# Patient Record
Sex: FEMALE | Race: WHITE | Employment: STUDENT | URBAN - METROPOLITAN AREA
[De-identification: names, ages, dates, MRNs, and addresses within clinical notes are randomized per-mention and may not be internally consistent; named-entity substitution may affect disease eponyms.]

---

## 2018-12-28 ENCOUNTER — OFFICE VISIT (OUTPATIENT)
Dept: OBGYN CLINIC | Age: 23
End: 2018-12-28

## 2018-12-28 VITALS — HEIGHT: 71 IN | BODY MASS INDEX: 21.84 KG/M2 | WEIGHT: 156 LBS

## 2018-12-28 DIAGNOSIS — Z01.419 WELL WOMAN EXAM WITH ROUTINE GYNECOLOGICAL EXAM: Primary | ICD-10-CM

## 2018-12-28 RX ORDER — DESOGESTREL AND ETHINYL ESTRADIOL 0.15-0.03
1 KIT ORAL DAILY
Qty: 3 PACKAGE | Refills: 4 | Status: SHIPPED | OUTPATIENT
Start: 2018-12-28

## 2018-12-28 RX ORDER — LEVOCETIRIZINE DIHYDROCHLORIDE 5 MG/1
TABLET, FILM COATED ORAL
COMMUNITY

## 2018-12-28 NOTE — PROGRESS NOTES
Annual exam ages 65-17    Balwinderbarry Fulton Carlos Fonseca is a No obstetric history on file. ,  26 y.o. female [facility] Patient's last menstrual period was 12/28/2018 (exact date). .    She presents for her annual checkup. She is having significant irregular periods, varying in length from 24-30 days. She desires to go on birth control to help regulate menses. Third year at [employer]; considering [employer] or global development. Hasn't been sexually active. Interested in improving menses  With regard to the Gardasil vaccine, she has received all 3 injections. Menstrual status:    Her periods are variant in flow. She is using three to five pads or tampons per day, lasting 24-30 days. She denies dysmenorrhea. She reports no premenstrual symptoms. Contraception:    The current method of family planning is none. Sexual history:     She  reports that she does not engage in sexual activity. .    Medical conditions:    She has never had an annual exam.    Pap and Mammogram History:    She has never had a pap smear. Breast Cancer History/Substance Abuse: negative    Past Medical History:   Diagnosis Date    Asthma      Past Surgical History:   Procedure Laterality Date    HX HEENT      Dental    HX WISDOM TEETH EXTRACTION         Current Outpatient Medications   Medication Sig Dispense Refill    levocetirizine (XYZAL) 5 mg tablet Take  by mouth. Allergies: Codeine     Tobacco History:  reports that  has never smoked. she has never used smokeless tobacco.  Alcohol Abuse:  reports that she drinks alcohol. Drug Abuse:  reports that she does not use drugs.     Family Medical/Cancer History:   Family History   Problem Relation Age of Onset    Breast Cancer Maternal Grandmother     Heart Disease Maternal Grandmother     Heart Disease Maternal Grandfather     Ovarian Cancer Paternal Grandfather         Review of Systems - History obtained from the patient  Constitutional: negative for weight loss, fever, night sweats  HEENT: negative for hearing loss, earache, congestion, snoring, sorethroat  CV: negative for chest pain, palpitations, edema  Resp: negative for cough, shortness of breath, wheezing  GI: negative for change in bowel habits, abdominal pain, black or bloody stools  : negative for frequency, dysuria, hematuria, vaginal discharge  MSK: negative for back pain, joint pain, muscle pain  Breast: negative for breast lumps, nipple discharge, galactorrhea  Skin :negative for itching, rash, hives  Neuro: negative for dizziness, headache, confusion, weakness  Psych: negative for anxiety, depression, change in mood  Heme/lymph: negative for bleeding, bruising, pallor    Physical Exam    Visit Vitals  Ht 5' 11\" (1.803 m)   Wt 156 lb (70.8 kg)   LMP 12/28/2018 (Exact Date)   BMI 21.76 kg/m²       Constitutional  · Appearance: well-nourished, well developed, alert, in no acute distress    HENT  · Head and Face: appears normal    Chest  · Respiratory Effort: breathing unlabored    Breasts  · Inspection of Breasts: breasts symmetrical, no skin changes, no discharge present, nipple appearance normal, no skin retraction present  · Palpation of Breasts and Axillae: no masses present on palpation, no breast tenderness  · Axillary Lymph Nodes: no lymphadenopathy present    Gastrointestinal  · Abdominal Examination: abdomen non-tender to palpation, normal bowel sounds, no masses present  · Liver and spleen: no hepatomegaly present, spleen not palpable  · Hernias: no hernias identified    Genitourinary  · External Genitalia: normal appearance for age, no discharge present, no tenderness present, no inflammatory lesions present, no masses present, no atrophy present  · Vagina: normal vaginal vault without central or paravaginal defects, no discharge present, no inflammatory lesions present, no masses present  · Bladder: non-tender to palpation  · Urethra: appears normal  · Cervix: normal   · Uterus: normal size, shape and consistency  · Adnexa: no adnexal tenderness present, no adnexal masses present  · Perineum: perineum within normal limits, no evidence of trauma, no rashes or skin lesions present  · Anus: anus within normal limits, no hemorrhoids present  · Inguinal Lymph Nodes: no lymphadenopathy present    Skin  · General Inspection: no rash, no lesions identified    Neurologic/Psychiatric  · Mental Status:  · Orientation: grossly oriented to person, place and time  · Mood and Affect: mood normal, affect appropriate    . Assessment:  Routine gynecologic examination  Her current medical status is satisfactory with no evidence of significant gynecologic issues.     Plan:  GC/ chlamydia offered and declined  Interested in trial of ocp; script for desogen shared and literature regarding Myrl Davis IUD  Counseled re: diet, exercise, healthy lifestyle  Return for yearly wellness visits  Gardisil counseling provided  Rec screening mammo at either 37 or 42

## 2020-01-07 ENCOUNTER — OFFICE VISIT (OUTPATIENT)
Dept: OBGYN CLINIC | Age: 25
End: 2020-01-07

## 2020-01-07 VITALS
BODY MASS INDEX: 22.88 KG/M2 | DIASTOLIC BLOOD PRESSURE: 80 MMHG | SYSTOLIC BLOOD PRESSURE: 124 MMHG | HEIGHT: 71 IN | WEIGHT: 163.4 LBS

## 2020-01-07 DIAGNOSIS — Z01.419 WELL WOMAN EXAM WITH ROUTINE GYNECOLOGICAL EXAM: ICD-10-CM

## 2020-01-07 DIAGNOSIS — F41.1 GAD (GENERALIZED ANXIETY DISORDER): Primary | ICD-10-CM

## 2020-01-07 RX ORDER — ALPRAZOLAM 0.25 MG/1
0.25 TABLET ORAL ONCE
Qty: 2 TAB | Refills: 0 | Status: SHIPPED | OUTPATIENT
Start: 2020-01-07 | End: 2020-01-07

## 2020-01-07 RX ORDER — TRETIONIN 0.1 MG/G
GEL TOPICAL
COMMUNITY

## 2020-01-07 NOTE — PATIENT INSTRUCTIONS

## 2020-01-07 NOTE — PROGRESS NOTES
Jayjay Soni is a No obstetric history on file. ,  27 y.o. female [facility] whose Patient's last menstrual period was 12/30/2019 (exact date). was on 12/30/2019 who presents for her annual checkup. She is having no significant problems. Going to [place] for 2 years in June with [employer].  Interested in discussing IUD. Menstrual status:    Her periods are normal in flow    She denies dysmenorrhea. She reports no premenstrual symptoms. Contraception:    The current method of family planning is abstinence. Sexual history:    She  reports never being sexually active. Medical conditions:    Since her last annual GYN exam about one year ago, she has not the following changes in her health history: none. Pap and Mammogram History:    Has never had a pap smear due to age/protocol. The patient has never had a mammogram.    The patient does have a family history of breast cancer. Past Medical History:   Diagnosis Date    Asthma      Past Surgical History:   Procedure Laterality Date    HX HEENT      Dental    HX WISDOM TEETH EXTRACTION         Current Outpatient Medications   Medication Sig Dispense Refill    tretinoin (RETIN-A) 0.01 % topical gel Apply  to affected area nightly.  levocetirizine (XYZAL) 5 mg tablet Take  by mouth.  desogestrel-ethinyl estradiol (DESOGEN) 0.15-0.03 mg tab Take 1 Tab by mouth daily.  3 Package 4     Allergies: Codeine   Social History     Socioeconomic History    Marital status: SINGLE     Spouse name: Not on file    Number of children: Not on file    Years of education: Not on file    Highest education level: Not on file   Occupational History    Not on file   Social Needs    Financial resource strain: Not on file    Food insecurity:     Worry: Not on file     Inability: Not on file    Transportation needs:     Medical: Not on file     Non-medical: Not on file   Tobacco Use    Smoking status: Never Smoker    Smokeless tobacco: Never Used   Substance and Sexual Activity    Alcohol use: Yes     Comment: Social    Drug use: No    Sexual activity: Never   Lifestyle    Physical activity:     Days per week: Not on file     Minutes per session: Not on file    Stress: Not on file   Relationships    Social connections:     Talks on phone: Not on file     Gets together: Not on file     Attends Sikhism service: Not on file     Active member of club or organization: Not on file     Attends meetings of clubs or organizations: Not on file     Relationship status: Not on file    Intimate partner violence:     Fear of current or ex partner: Not on file     Emotionally abused: Not on file     Physically abused: Not on file     Forced sexual activity: Not on file   Other Topics Concern    Not on file   Social History Narrative    Not on file     Tobacco History:  reports that she has never smoked. She has never used smokeless tobacco.  Alcohol Abuse:  reports current alcohol use. Drug Abuse:  reports no history of drug use. There is no problem list on file for this patient.       Review of Systems - History obtained from the patient  Constitutional: negative for weight loss, fever, night sweats  HEENT: negative for hearing loss, earache, congestion, snoring, sorethroat  CV: negative for chest pain, palpitations, edema  Resp: negative for cough, shortness of breath, wheezing  GI: negative for change in bowel habits, abdominal pain, black or bloody stools  : negative for frequency, dysuria, hematuria, vaginal discharge  MSK: negative for back pain, joint pain, muscle pain  Breast: negative for breast lumps, nipple discharge, galactorrhea  Skin :negative for itching, rash, hives  Neuro: negative for dizziness, headache, confusion, weakness  Psych: negative for anxiety, depression, change in mood  Heme/lymph: negative for bleeding, bruising, pallor    Physical Exam    Visit Vitals  /80   Ht 5' 11\" (1.803 m)   Wt 163 lb 6.4 oz (74.1 kg) LMP 12/30/2019 (Exact Date)   BMI 22.79 kg/m²       Constitutional  · Appearance: well-nourished, well developed, alert, in no acute distress    HENT  · Head and Face: appears normal    Neck  · Inspection/Palpation: normal appearance, no masses or tenderness  · Lymph Nodes: no lymphadenopathy present  · Thyroid: gland size normal, nontender, no nodules or masses present on palpation    Chest  · Respiratory Effort: breathing normal  · Auscultation: normal breath sounds    Cardiovascular  · Heart:  · Auscultation: regular rate and rhythm without murmur    Breasts  · Inspection of Breasts: breasts symmetrical, no skin changes, no discharge present, nipple appearance normal, no skin retraction present  · Palpation of Breasts and Axillae: no masses present on palpation, no breast tenderness  · Axillary Lymph Nodes: no lymphadenopathy present    Gastrointestinal  · Abdominal Examination: abdomen non-tender to palpation, normal bowel sounds, no masses present  · Liver and spleen: no hepatomegaly present, spleen not palpable  · Hernias: no hernias identified    Genitourinary  · External Genitalia: normal appearance for age, no discharge present, no tenderness present, no inflammatory lesions present, no masses present, no atrophy present  · Vagina: normal vaginal vault without central or paravaginal defects, no discharge present, no inflammatory lesions present, no masses present  · Bladder: non-tender to palpation  · Urethra: appears normal  · Cervix: normal   · Uterus: normal size, shape and consistency  · Adnexa: no adnexal tenderness present, no adnexal masses present  · Perineum: perineum within normal limits, no evidence of trauma, no rashes or skin lesions present  · Anus: anus within normal limits, no hemorrhoids present  · Inguinal Lymph Nodes: no lymphadenopathy present    Skin  · General Inspection: no rash, no lesions identified    Neurologic/Psychiatric  · Mental Status:  · Orientation: grossly oriented to person, place and time  · Mood and Affect: mood normal, affect appropriate    . Assessment:  Routine gynecologic examination  Her current medical status is satisfactory with no evidence of significant gynecologic issues. Plan:  Pap today  RTO for Kyleena insert on menses  Rx for Xanax 0.25 prior to insert  Counseled re: diet, exercise, healthy lifestyle  Return for yearly wellness visits  Gardisil counseling provided  Pt counseled regarding co-testing for high risk HPV with pap  Rec screening mammo    Hermila Partida Asa, MATT/ISAIASM

## 2020-01-09 LAB
CYTOLOGIST CVX/VAG CYTO: NORMAL
CYTOLOGY CVX/VAG DOC CYTO: NORMAL
CYTOLOGY CVX/VAG DOC THIN PREP: NORMAL
DX ICD CODE: NORMAL
LABCORP, 190119: NORMAL
Lab: NORMAL
OTHER STN SPEC: NORMAL
STAT OF ADQ CVX/VAG CYTO-IMP: NORMAL

## 2020-01-28 ENCOUNTER — OFFICE VISIT (OUTPATIENT)
Dept: OBGYN CLINIC | Age: 25
End: 2020-01-28

## 2020-01-28 VITALS
DIASTOLIC BLOOD PRESSURE: 84 MMHG | WEIGHT: 159 LBS | BODY MASS INDEX: 22.26 KG/M2 | HEIGHT: 71 IN | SYSTOLIC BLOOD PRESSURE: 124 MMHG

## 2020-01-28 DIAGNOSIS — Z30.430 VISIT FOR INSERTION OF INTRAUTERINE DEVICE: Primary | ICD-10-CM

## 2020-01-28 NOTE — PROGRESS NOTES
[facility] AT [address] [place] [address]  OFFICE PROCEDURE PROGRESS NOTE        Chart reviewed for the following:   Tess Calderon, have reviewed the History, Physical and updated the Allergic reactions for [place] performed immediately prior to start of procedure:   Tess Calderon, have performed the following reviews on [facility] prior to the start of the procedure:            * Patient was identified by name and date of birth   * Agreement on procedure being performed was verified  * Risks and Benefits explained to the patient  * Procedure site verified and marked as necessary  * Patient was positioned for comfort  * Consent was signed and verified     Time: 1:55 PM      Date of procedure: 1/28/2020    Procedure performed by:  Aurora Jackson NP    Provider assisted by: Neha Glass LPN    Patient assisted by: self    How tolerated by patient: tolerated the procedure well with no complications    Post Procedural Pain Scale: 2 - Hurts Little Bit    Comments: none        Kyleena IUD INSERTION  Indications: [facility] is a [de-identified] ,  27 y.o. female [facility] Patient's last menstrual period was 12/30/2019 (exact date). Her LMP was normal in duration and amount of flow. She  presents for insertion of an IUD. The risks, benefits and alternatives of IUD insertion were discussed in detail at last visit. She also has reviewed Trey Majano information. She has elected to proceed with the insertion today and she states she has no further questions. A urine pregnancy test was negative   Procedure: The pelvic exam revealed normal external genitalia. A speculum was inserted into the vagina and the cervix was visualized. The cervix was prepped with betadine solution. The anterior lip of the cervix was grasped with a single toothed tenaculum. The uterus was sounded with a Pipelle to 6.5 centimeters. Jack Dheeraj was then inserted without difficulty.  The string was cut to 3 centimeters. She experienced a mild  amount of cramping. Post Procedure Status:   She tolerated the procedure with mild discomfort. The patient was observed for 10 minutes after the insertion. There were no complications. Patient was discharged in stable condition. The patient received [facility] lot number [id number]. PAINS precautions rev. String demo  RTO 4 wks for [employer].  MATT Partida Asa/JEFF

## 2020-01-28 NOTE — PATIENT INSTRUCTIONS
Levonorgestrel (Into the uterus)   Levonorgestrel (poe-leq-ndi-MANFRED-trel)  Prevents pregnancy and treats heavy menstrual bleeding. This is an intrauterine device (IUD), which is a reversible form of birth control. This IUD slowly releases levonorgestrel, a hormone. Brand Name(s): Delberenice Parmjit, Mirena, Juana Núñez   There may be other brand names for this medicine. When This Medicine Should Not Be Used: This device is not right for everyone. Do not use it if you had an allergic reaction to levonorgestrel, or if you are pregnant. How to Use This Medicine:   Device  · A nurse or other trained health professional will give you this medicine. · The IUD is usually inserted by your doctor during your monthly period. You will need to see your doctor 4 to 6 weeks after the IUD is placed and then once a year. · Your IUD has a string or \"tail\" that is made of plastic thread. About one or two inches of this string hangs into your vagina. You cannot see this string, and it will not cause problems when you have sex. Check your IUD after each monthly period. You may not be protected against pregnancy if you cannot feel the string or if you feel plastic. Do the following to check the placement of your IUD:  [facility] AUTHORITY your hands with soap and warm water. Dry them with a clean towel. ¨ Bend your knees and squat low to the ground. ¨ Gently put your index finger high inside your vagina. The cervix is at the top of the vagina. Find the IUD string coming from your cervix. Never pull on the string. You should not be able to feel the plastic of the IUD itself. Wash your hands after you are done checking your IUD string. · Your doctor will need to remove your IUD after 3 years for [place], after 4 years for [place], or after 5 years for [facility] or [facility]. You will also need to have it replaced if it comes out of your uterus. If you are using Mirena® or Liletta® and want to stop, your doctor can remove it at any time.  However, you may become pregnant as soon as Mirena® is removed or if you have intercourse the week before Francies Ogren is removed. Use another form of birth control or have a new IUD inserted to keep from getting pregnant. Drugs and Foods to Avoid:   Ask your doctor or pharmacist before using any other medicine, including over-the-counter medicines, vitamins, and herbal products. · Some medicines can affect how this device works. Tell your doctor if you are using a blood thinner (including warfarin). Warnings While Using This Medicine:   · Tell your doctor if you are breastfeeding, or if you had a baby, miscarriage, or  in the past 3 months. Tell your doctor if you have liver disease (including tumor or cancer), heart disease, breast cancer, heart or blood circulation problems, migraine, high blood pressure, or a history of heart valve problems, blood clotting problems, stroke, or heart attack. Tell your doctor if you have problems with your immune system or have had surgery on your female organs (especially fallopian tubes). · Tell your doctor if you have had any problems, infections, or other conditions that affected your reproductive system. There are many problems that could make an IUD a bad choice for you, including if you have fibroids, unexplained bleeding, a uterus that has an unusual shape, a recent infection, a history of pelvic inflammatory disease, an abnormal Pap test, ectopic pregnancy, cancer or suspected cancer, or an existing IUD. · There is a small chance that you could get pregnant when using an IUD, just as there is with any birth control. If you get pregnant, your doctor may remove your IUD to lower the risk of miscarriage or other problems.   · This medicine may cause the following problems:  ¨ Increased risk of ectopic pregnancy (pregnancy outside the uterus)  ¨ Increased risk of serious infections, including sepsis  ¨ Increased risk of pelvic inflammatory disease (PID) or endometritis  ¨ Perforation (hole in the wall of your uterus), which can damage other organs  ¨ Increased risk for ovarian cysts  ¨ Increased risk of breast cancer  ¨ Increased risk of high blood pressure, stroke, heart attack, or clotting problems  · You might have some spotting and cramping during the first weeks after the IUD has been inserted. These symptoms should decrease or go away within a few weeks up to 6 months. · You could have less bleeding or even stop having periods by the end of the first year. Call your doctor if you have a change from your regular bleeding pattern after you have had your IUD for awhile, such as more bleeding or if you miss a period (and you were having periods even with your IUD). · An IUD can slip partly or all the way out of your uterus. If this happens, use condoms or another form of birth control, and call your doctor right away. · This IUD will not protect you from HIV/AIDS or other sexually transmitted diseases. · If you have the [address] or Zuhair Eulalia, tell your healthcare provider before you have an MRI test.  · Your doctor will check your progress and the effects of this medicine at regular visits. Keep all appointments.   Possible Side Effects While Using This Medicine:   Call your doctor right away if you notice any of these side effects:  · Allergic reaction: Itching or hives, swelling in your face or hands, swelling or tingling in your mouth or throat, chest tightness, trouble breathing  · Chest pain, problems with speech or walking, numbness or weakness in your arm or leg or on one side of your body  · Heavy bleeding from your vagina  · Pain during sex, or if your partner feels the hard plastic of the IUD during sex  · Severe headache, vision changes  · Stomach or pelvic pain, tenderness, or cramping that is sudden or severe  · Unusual bleeding, bruising, or weakness  · Vaginal discharge that has a bad smell, fever, chills, sores on your genitals  · Yellow skin or eyes  If you notice these less serious side effects, talk with your doctor:   · Acne or other skin changes  · Breast pain  · Change in bleeding pattern after the first few months  · Dizziness or lightheadedness after IUD is placed  · Mild itching around your vagina and genitals  If you notice other side effects that you think are caused by this medicine, tell your doctor. Call your doctor for medical advice about side effects. You may report side effects to FDA at 3-866-TTT-3107  © 2017 [facility] [facility] [place] Information is for End User's use only and may not be sold, redistributed or otherwise used for commercial purposes. The above information is an  only. It is not intended as medical advice for individual conditions or treatments. Talk to your doctor, nurse or pharmacist before following any medical regimen to see if it is safe and effective for you.

## 2020-02-24 ENCOUNTER — OFFICE VISIT (OUTPATIENT)
Dept: OBGYN CLINIC | Age: 25
End: 2020-02-24

## 2020-02-24 VITALS
SYSTOLIC BLOOD PRESSURE: 120 MMHG | DIASTOLIC BLOOD PRESSURE: 76 MMHG | BODY MASS INDEX: 22.68 KG/M2 | WEIGHT: 162 LBS | HEIGHT: 71 IN

## 2020-02-24 DIAGNOSIS — Z30.431 IUD CHECK UP: Primary | ICD-10-CM

## 2020-02-24 RX ORDER — BISMUTH SUBSALICYLATE 262 MG
1 TABLET,CHEWABLE ORAL DAILY
COMMUNITY

## 2020-02-24 NOTE — PROGRESS NOTES
IUD followup note    This is a follow-up visit for Priyanka Post is a [de-identified] ,  27 y.o. female [facility] Patient's last menstrual period was 01/27/2020. Quincy Frausto She had an [place] IUD placed six weeks ago. Since the IUD placement, the patient has not had any unusual complaints. She has had some mild non-menstrual bleeding. She describes having a spotting amount of blood-tinged discharge which has occurred off and on since insertion of the [place]. She has not had significant pain. She has had no fever. Associated signs and symptoms: she denies dyspareunia, expulsion, heavy bleeding, increased pain, fever, and pelvic pain. Past Medical History:   Diagnosis Date    Asthma      Past Surgical History:   Procedure Laterality Date    HX HEENT      Dental    HX WISDOM TEETH EXTRACTION       Social History     Occupational History    Not on file   Tobacco Use    Smoking status: Never Smoker    Smokeless tobacco: Never Used   Substance and Sexual Activity    Alcohol use: Yes     Comment: Social    Drug use: No    Sexual activity: Never     Family History   Problem Relation Age of Onset    Breast Cancer Maternal Grandmother         postmenopausal    Heart Disease Maternal Grandmother     Heart Disease Maternal Grandfather     No Known Problems Mother     No Known Problems Father     Cancer Paternal Grandmother         Bladder       Allergies   Allergen Reactions    Codeine Nausea and Vomiting     Prior to Admission medications    Medication Sig Start Date End Date Taking? Authorizing Provider   multivitamin (ONE A DAY) tablet Take 1 Tab by mouth daily. Yes Provider, Historical   levocetirizine (XYZAL) 5 mg tablet Take  by mouth. Yes Provider, Historical   tretinoin (RETIN-A) 0.01 % topical gel Apply  to affected area nightly. Provider, Historical   desogestrel-ethinyl estradiol (DESOGEN) 0.15-0.03 mg tab Take 1 Tab by mouth daily.  82/29/95   Kelly Isbell MD        Review of Systems: History obtained from the patient  Constitutional: negative for weight loss, fever, night sweats  Breast: negative for breast lumps, nipple discharge, galactorrhea  GI: negative for change in bowel habits, abdominal pain, black or bloody stools  : negative for frequency, dysuria, hematuria, vaginal discharge  MSK: negative for back pain, joint pain, muscle pain  Skin: negative for itching, rash, hives  Psych: negative for anxiety, depression, change in mood      Objective:  Visit Vitals  /76 (BP 1 Location: Left arm, BP Patient Position: Sitting)   Ht 5' 11\" (1.803 m)   Wt 162 lb (73.5 kg)   LMP 01/27/2020   BMI 22.59 kg/m²       Physical Exam:   PHYSICAL EXAMINATION    Constitutional  · Appearance: well-nourished, well developed, alert, in no acute distress    Gastrointestinal  · Abdominal Examination: abdomen non-tender to palpation, normal bowel sounds, no masses present  · Liver and spleen: no hepatomegaly present, spleen not palpable  · Hernias: no hernias identified    Genitourinary  · External Genitalia: normal appearance for age, no discharge present, no tenderness present, no inflammatory lesions present, no masses present, no atrophy present  · Vagina: normal vaginal vault without central or paravaginal defects, no discharge present, no inflammatory lesions present, no masses present  · Bladder: non-tender to palpation  · Urethra: appears normal  · Cervix: normal with IUD string visible and appropriate length   · Uterus: normal size, shape and consistency  · Adnexa: no adnexal tenderness present, no adnexal masses present  · Perineum: perineum within normal limits, no evidence of trauma, no rashes or skin lesions present    Skin  · General Inspection: no rash, no lesions identified    Neurologic/Psychiatric  · Mental Status:  · Orientation: grossly oriented to person, place and time  · Mood and Affect: mood normal, affect appropriate    Assessment:  Doing well with expected mild irregular bleeding. Plan:   RTO for AE as scheduled.

## 2020-02-24 NOTE — PATIENT INSTRUCTIONS
Learning About Birth Control: Intrauterine Device (IUD)  What is an intrauterine device (IUD)? The intrauterine device (IUD) is used to prevent pregnancy. It's a small, plastic, T-shaped device. Your doctor places the IUD in your uterus. You have a choice between a hormonal IUD and a copper IUD. The hormonal IUD can prevent pregnancy for 3 to 5 years, depending on which IUD is used. Once you have it, you don't have to do anything else to prevent pregnancy. The copper IUD can prevent pregnancy for 10 years. Once you have it, you don't have to do anything else to prevent pregnancy. A string tied to the end of the IUD hangs down through the opening of the uterus (called the cervix) into the vagina. You can check that the IUD is in place by feeling for the string. The IUD usually stays in the uterus until your doctor removes it. How well does it work? In the first year of use:  · When the hormonal IUD is used exactly as directed, fewer than 1 woman out of 100 has an unplanned pregnancy. · When the copper IUD is used exactly as directed, fewer than 1 woman out of 100 has an unplanned pregnancy. Be sure to tell your doctor about any health problems you have or medicines you take. He or she can help you choose the birth control method that is right for you. What are the advantages of an IUD? · An IUD is one of the most effective methods of birth control. · It prevents pregnancy for 3 to 10 years, depending on the type. You don't have to worry about birth control during this time. · It's safe to use while breastfeeding. · IUDs don't contain estrogen. So you can use an IUD if you don't want to take estrogen or can't take estrogen because you have certain health problems or concerns. · An IUD is convenient. It is always providing birth control. You don't need to remember to take a pill or get a shot. You don't have to interrupt sex to protect against pregnancy.   · A hormonal IUD may reduce heavy bleeding and cramping. What are the disadvantages of an IUD? · An IUD doesn't protect against sexually transmitted infections (STIs), such as herpes or HIV/AIDS. If you aren't sure if your sex partner might have an STI, use a condom to protect against disease. · A copper IUD may cause periods with more bleeding and cramping. · You have to see a doctor to have an IUD inserted and removed. · You have to check to see if the string is in place. Where can you learn more? Go to http://dex-aakash.info/. Enter N562 in the search box to learn more about \"Learning About Birth Control: Intrauterine Device (IUD). \"  Current as of: May 29, 2019  Content Version: 12.2  © 6489-2216 [employer]. Care instructions adapted under license by [employer] (which disclaims liability or warranty for this information). If you have questions about a medical condition or this instruction, always ask your healthcare professional. [address] any warranty or liability for your use of this information.

## 2021-08-09 ENCOUNTER — OFFICE VISIT (OUTPATIENT)
Dept: OBGYN CLINIC | Age: 26
End: 2021-08-09
Payer: COMMERCIAL

## 2021-08-09 VITALS — DIASTOLIC BLOOD PRESSURE: 72 MMHG | WEIGHT: 149 LBS | BODY MASS INDEX: 20.78 KG/M2 | SYSTOLIC BLOOD PRESSURE: 108 MMHG

## 2021-08-09 DIAGNOSIS — Z01.419 WELL WOMAN EXAM: Primary | ICD-10-CM

## 2021-08-09 DIAGNOSIS — Z11.51 SPECIAL SCREENING EXAMINATION FOR HUMAN PAPILLOMAVIRUS (HPV): ICD-10-CM

## 2021-08-09 PROCEDURE — 99395 PREV VISIT EST AGE 18-39: CPT | Performed by: ADVANCED PRACTICE MIDWIFE

## 2021-08-09 NOTE — PROGRESS NOTES
Annual exam ages 88-29    [id number] Rom Burleson is a [de-identified] ,  23 y.o. female   No LMP recorded. (Menstrual status: IUD). She presents for her annual checkup. She is having no significant problems. With regard to the Gardasil vaccine, she has received all 3 injections. Menstrual status:    Her periods are spotting in flow. She is using one to three pads or tampons per day, minimal to none with hormone containing IUS. She does not have dysmenorrhea. She reports no premenstrual symptoms. Contraception:    The current method of family planning is Hormonal IUS. Sexual history:    She  reports never being sexually active. Medical conditions:    Since her last annual GYN exam about one year ago, she has had the following changes in her health history: none. Surgical history confirmed with patient. has a past surgical history that includes hx wisdom teeth extraction and hx heent. Pap and Mammogram History:    Her most recent Pap smear was normal, obtained 1 year(s) ago. The patient has never had a mammogram.    Breast Cancer History/Substance Abuse: negative    Past Medical History:   Diagnosis Date    Asthma      Past Surgical History:   Procedure Laterality Date    HX HEENT      Dental    HX WISDOM TEETH EXTRACTION         Current Outpatient Medications   Medication Sig Dispense Refill    multivitamin (ONE A DAY) tablet Take 1 Tab by mouth daily.  levonorgestreL (KYLEENA) 17.5 mcg/24 hrs (5 yrs) 19.5 mg IUD IUD 1 Each by IntraUTERine route once.  tretinoin (RETIN-A) 0.01 % topical gel Apply  to affected area nightly.  levocetirizine (XYZAL) 5 mg tablet Take  by mouth.  desogestrel-ethinyl estradiol (DESOGEN) 0.15-0.03 mg tab Take 1 Tab by mouth daily. 3 Package 4     Allergies: Codeine     Tobacco History:  reports that she has never smoked. She has never used smokeless tobacco.  Alcohol Abuse:  reports current alcohol use.   Drug Abuse:  reports no history of drug use. Family Medical/Cancer History:   Family History   Problem Relation Age of Onset    Breast Cancer Maternal Grandmother         postmenopausal    Heart Disease Maternal Grandmother     Heart Disease Maternal Grandfather     No Known Problems Mother     No Known Problems Father     Cancer Paternal Grandmother         Bladder        Review of Systems - History obtained from the patient  Constitutional: negative for weight loss, fever, night sweats  HEENT: negative for hearing loss, earache, congestion, snoring, sorethroat  CV: negative for chest pain, palpitations, edema  Resp: negative for cough, shortness of breath, wheezing  GI: negative for change in bowel habits, abdominal pain, black or bloody stools  : negative for frequency, dysuria, hematuria, vaginal discharge  MSK: negative for back pain, joint pain, muscle pain  Breast: negative for breast lumps, nipple discharge, galactorrhea  Skin :negative for itching, rash, hives  Neuro: negative for dizziness, headache, confusion, weakness  Psych: negative for anxiety, depression, change in mood  Heme/lymph: negative for bleeding, bruising, pallor    Physical Exam    There were no vitals taken for this visit. Constitutional  · Appearance: well-nourished, well developed, alert, in no acute distress    HENT  · Head and Face: appears normal,   · Eyes: Sclera clear. Conjunctiva pink, no drainage.        Neck  · Inspection/Palpation: normal appearance, no masses or tenderness  · Lymph Nodes: no lymphadenopathy present  · Thyroid: NSSC, NT    Chest  · Respiratory Effort: breathing unlabored      Cardiovascular  · Heart:  · Auscultation: regular rate     Breasts  · Inspection of Breasts: breasts symmetrical, no skin changes, no discharge present, nipple appearance normal, no skin retraction present  · Palpation of Breasts and Axillae: no masses present on palpation, no breast tenderness  · Axillary Lymph Nodes: no lymphadenopathy present    Gastrointestinal  · Abdominal Examination: abdomen non-tender to palpation, no masses present  · Hernias: no hernias identified  · CVA: Neg/NT Bilat    Genitourinary  · External Genitalia: EGBUS normal appearance for age,  no tenderness, inflammatory lesions, masses or atrophy present  · Vagina: normal vaginal vault without central or paravaginal defects, Physiologic discharge present, no inflammatory lesions or masses noted. · Bladder: non-tender to palpation  · Urethra: appears normal  · Cervix: normal, No CMT - ectropion around cervical os   · Uterus: normal size, shape and consistency  · Adnexa: no adnexal tenderness or masses present bilat  · Perineum: perineum normal, no evidence of trauma, rashes or skin lesions present  · Anus: normal appearance without fissures, lesions or hemorrhoids present  · Inguinal Lymph Nodes: no lymphadenopathy present    Skin  · General Inspection: no rash, no lesions identified    Neurologic/Psychiatric  · Mental Status:  · Orientation: grossly oriented to person, place and time  · Mood and Affect: mood normal, affect appropriate    . Assessment:  Routine gynecologic examination  Her current medical status is satisfactory with no evidence of significant gynecologic issues.     Pap collected  Declines STD  Declines Chaperone    Plan:  Counseled re: diet, exercise, healthy lifestyle  Return for yearly wellness visits or prn       Jamir Nascimento CNM

## 2021-08-09 NOTE — PATIENT INSTRUCTIONS
Pelvic Exam: Care Instructions  Overview     When your doctor examines your pelvic organs, it's called a pelvic exam. This exam is done to evaluate symptoms, such as pelvic pain or abnormal vaginal bleeding and discharge. It may also be done to collect samples of cells for cervical cancer screening. Before your exam, it's important to share some information with your doctor. You can talk about any concerns you may have. Your doctor will also want to know if you are pregnant or use birth control. And your doctor will want to hear about any problems, surgeries, or procedures you have had in your pelvic area. You will also need to tell your doctor when your last period was. Follow-up care is a key part of your treatment and safety. Be sure to make and go to all appointments, and call your doctor if you are having problems. It's also a good idea to know your test results and keep a list of the medicines you take. How is a pelvic exam done? · During a pelvic exam, you will:  ? Take off your clothes below the waist. You will get a paper or cloth cover to put over the lower half of your body. ? Lie on your back on an exam table with your feet and legs supported by footrests. · The doctor may:  ? Ask you to relax your knees. Your knees need to lean out, toward the walls. ? Check the opening of your vagina for sores or swelling. ? Gently put a tool called a speculum into your vagina. It opens the vagina a little bit. You may feel some pressure. The speculum lets your doctor see inside the vagina. ? Use a small brush, spatula, or swab to get a sample for testing. The doctor then removes the speculum. ? Put on gloves and put one or two fingers of one hand into your vagina. The other hand goes on your lower belly. This lets your doctor feel your pelvic organs. You will probably feel some pressure. ? Put one gloved finger into your rectum and one into your vagina, if needed.  This can also help check your pelvic organs. You may have a small amount of vaginal discharge or bleeding after the exam.  Why is a pelvic exam done? A pelvic exam may be done:  · To collect samples of cells for cervical cancer screening. · To check for vaginal infection. · To check for sexually transmitted infections, such as chlamydia or herpes. · To help find the cause of abnormal uterine bleeding. · To look for problems like uterine fibroids, ovarian cysts, or uterine prolapse. · To help find the cause of pelvic or belly pain. · Before inserting an intrauterine device (IUD). · To collect evidence if you've been sexually assaulted. What are the risks of a pelvic exam?  There is a small chance that the doctor will find something on a pelvic exam that would not have caused a problem. This is called overdiagnosis. It could lead to tests or treatment you don't need. When should you call for help? Watch closely for changes in your health, and be sure to contact your doctor if you have any problems. Where can you learn more? Go to http://www.gray.com/  Enter M421 in the search box to learn more about \"Pelvic Exam: Care Instructions. \"  Current as of: July 17, 2020               Content Version: 12.8  © 2140-6100 [employer]. Care instructions adapted under license by [employer] (which disclaims liability or warranty for this information). If you have questions about a medical condition or this instruction, always ask your healthcare professional. [place] [address] any warranty or liability for your use of this information.

## 2021-08-14 NOTE — PROGRESS NOTES
Good news Humphreymlel Cheatham,    Your pap is normal. Please let us know if you have any other questions or concerns.      Maggi

## 2023-05-11 RX ORDER — DESOGESTREL AND ETHINYL ESTRADIOL 0.15-0.03
1 KIT ORAL DAILY
COMMUNITY
Start: 2018-12-28

## 2023-05-11 RX ORDER — TRETINOIN 0.1 MG/G
GEL TOPICAL
COMMUNITY

## 2023-05-11 RX ORDER — LEVOCETIRIZINE DIHYDROCHLORIDE 5 MG/1
TABLET, FILM COATED ORAL
COMMUNITY